# Patient Record
(demographics unavailable — no encounter records)

---

## 2025-06-03 NOTE — ASSESSMENT
[FreeTextEntry1] : 28 year old male patient with mild resolved testicular discomfort. There is no concern based on ultrasound. All tests and results were discussed with patient. He can follow up on as needed basis if there are any further issues.  All of the patient's questions were otherwise answered. Total time=45 min.   The submitted E/M billing level for this visit reflects the total time spent on the day of the visit including face-to-face time spent with the patient, non-face-to-face review of medical records and relevant information, documentation, and asynchronous communication with the patient after a visit via phone, email, or patients EHR portal after the visit. The medical records reviewed are either scanned into the chart or reviewed with the patient using a patients electronic medical records portal for patients with records not available to [facility] via electronic transmission platforms from other institutions and labs. Time spent counseling and performing coordination of care was also included in determining the appropriate EM billing level.   I have reviewed and verified information regarding the chief complaint and history recorded by the ancillary staff and/or the patient. I have independently reviewed and interpreted tests performed by other physicians and facilities as necessary.   I have discussed with the patient differential diagnosis, reason for auxiliary tests if ordered, risks, benefits, alternatives, and complications of each form of therapy were discussed.  I personally performed the services described in the documentation, reviewed the documentation recorded by the scribe in my presence, and it accurately and completely records my words and actions.

## 2025-06-03 NOTE — PHYSICAL EXAM
[General Appearance - Well Developed] : well developed [General Appearance - In No Acute Distress] : no acute distress [Abdomen Soft] : soft [Abdomen Tenderness] : non-tender [Abdomen Mass (___ Cm)] : no abdominal mass palpated [Urethral Meatus] : meatus normal [Penis Abnormality] : normal circumcised penis [Epididymis] : the epididymides were normal [Testes Mass (___cm)] : there were no testicular masses [Oriented To Time, Place, And Person] : oriented to person, place, and time [Chaperone Present] : A chaperone was present in the examining room during all aspects of the physical examination [de-identified] : right epi is slightly schmidt than left along the head, nothing significant. No pain, only physiologic hydrocele present. [FreeTextEntry2] : Antonio Nichole RN

## 2025-06-03 NOTE — PHYSICAL EXAM
[General Appearance - Well Developed] : well developed [General Appearance - In No Acute Distress] : no acute distress [Abdomen Soft] : soft [Abdomen Tenderness] : non-tender [Abdomen Mass (___ Cm)] : no abdominal mass palpated [Urethral Meatus] : meatus normal [Penis Abnormality] : normal circumcised penis [Epididymis] : the epididymides were normal [Testes Mass (___cm)] : there were no testicular masses [Oriented To Time, Place, And Person] : oriented to person, place, and time [Chaperone Present] : A chaperone was present in the examining room during all aspects of the physical examination [de-identified] : right epi is slightly schmidt than left along the head, nothing significant. No pain, only physiologic hydrocele present. [FreeTextEntry2] : Antonio Nichole RN

## 2025-06-03 NOTE — ASSESSMENT
[FreeTextEntry1] : 28 year old male patient with mild resolved testicular discomfort. There is no concern based on ultrasound. All tests and results were discussed with patient. He can follow up on as needed basis if there are any further issues.  All of the patient's questions were otherwise answered. Total time=45 min.   The submitted E/M billing level for this visit reflects the total time spent on the day of the visit including face-to-face time spent with the patient, non-face-to-face review of medical records and relevant information, documentation, and asynchronous communication with the patient after a visit via phone, email, or patients EHR portal after the visit. The medical records reviewed are either scanned into the chart or reviewed with the patient using a patients electronic medical records portal for patients with records not available to [facility] via electronic transmission platforms from other institutions and labs. Time spent counseling and performing coordination of care was also included in determining the appropriate EM billing level.   I have reviewed and verified information regarding the chief complaint and history recorded by the ancillary staff and/or the patient. I have independently reviewed and interpreted tests performed by other physicians and facilities as necessary.   I have discussed with the patient differential diagnosis, reason for auxiliary tests if ordered, risks, benefits, alternatives, and complications of each form of therapy were discussed.  I personally performed the services described in the documentation, reviewed the documentation recorded by the scribe in my presence, and it accurately and completely records my words and actions.

## 2025-06-03 NOTE — ADDENDUM
[FreeTextEntry1] :  JERE HASKINS, am scribing for and in the presence of  in the following sections: HISTORY OF PRESENT ILLNESS, PAST MEDICAL/FAMILY/SOCIAL HISTORY, REVIEW OF SYSTEMS, VITAL SIGNS, PHYSICAL EXAM, ASSESSMENT/PLAN on 05/30/2025.

## 2025-06-03 NOTE — HISTORY OF PRESENT ILLNESS
[FreeTextEntry1] : Mr. Vazquez is a 28-year-old male patient seen in further evaluation for a possible right scrotal mass. He thought he felt something on the right side of this testicle and had mild discomfort. He had an ultrasound done, which I reviewed, and showed normal testes, normal flow, and there was no evidence of testicular masses. His right epididymis is slightly larger than the left but there is no evidence of inflammation or infection. There was no evidence of torsion. His discomfort was resolved; he just wanted to discuss because "hydrocele was noted." Patient has no voiding issues. He has nocturia x1 and feels his stream is good. He denies hematuria, dysuria, UTI, or stone disease.  PMHx: colonic polyp PSHx: hernia repair as an infant, he does not know which side SHx: denies tobacco, social alcohol use ALG: no known drug allergies